# Patient Record
(demographics unavailable — no encounter records)

---

## 2024-10-10 NOTE — PLAN
[FreeTextEntry1] : vulva normal today I would like to see her  with md when symptomatic and will call aygestin day 8 upcoming period go straight bc-1 week later-options reviewed and will call did not do carrier testing-declined testing full PE 2-3 months after wedding or sooner if vulvar edema/swelling

## 2024-10-10 NOTE — HISTORY OF PRESENT ILLNESS
[FreeTextEntry1] : 21 yo hx of crohns-meds skyrizi,rinoviq,colestid surg hx-ileostomy, colectomy lmp 9/15 here to regulate period for wedding 11/12/24 wants bc after wedding as well on occasion she gets bilateral vulvar swelling which last for a few weeks now feels fine saw 2 previous gyn md and nothing found

## 2024-10-10 NOTE — END OF VISIT
[FreeTextEntry3] : Patient was seen and examined, with ARAMIS GUTIERREZ , I agree with the above description of findings and I have personally formulated the plan of care for the patient. Normal exam today, will f/u prn or 2-3 months after weeding for complete exam and Pap.

## 2025-03-07 NOTE — PHYSICAL EXAM
[Chaperone Present] : A chaperone was present in the examining room during all aspects of the physical examination [Appropriately responsive] : appropriately responsive [Alert] : alert [No Acute Distress] : no acute distress [Soft] : soft [Non-tender] : non-tender [Non-distended] : non-distended [No HSM] : No HSM [No Lesions] : no lesions [No Mass] : no mass [Oriented x3] : oriented x3 [Examination Of The Breasts] : a normal appearance [No Masses] : no breast masses were palpable [Labia Majora] : normal [Labia Minora] : normal [Normal] : normal [Uterine Adnexae] : normal [FreeTextEntry2] : bilateral mild vulvar swelling

## 2025-03-07 NOTE — HISTORY OF PRESENT ILLNESS
[FreeTextEntry1] : 21 yo p0 med hx-crohns surg mp-rdskbysgz-tvoxxttgb meds-skyrizi,colestid-off rinvoq since planning pregnancy stopped ocp end of jan had withdrawal bleed, then bled 2 weeks later for 5 days-community  said she had a cervical polyp had a uti after wedding patient has hx of vulvar swelling which last for a few weeks-realized now that related to when she stops rinvoq here for annual

## 2025-03-07 NOTE — HISTORY OF PRESENT ILLNESS
[FreeTextEntry1] : 21 yo p0 med hx-crohns surg uv-ldovjpecq-avyzldlmb meds-skyrizi,colestid-off rinvoq since planning pregnancy stopped ocp end of jan had withdrawal bleed, then bled 2 weeks later for 5 days-community  said she had a cervical polyp had a uti after wedding patient has hx of vulvar swelling which last for a few weeks-realized now that related to when she stops rinvoq here for annual

## 2025-03-07 NOTE — END OF VISIT
[FreeTextEntry3] : as above pt seen and examined  annual-pap  watch cycles urine sent for culture f/u 1 yr prn.